# Patient Record
Sex: MALE | Race: WHITE
[De-identification: names, ages, dates, MRNs, and addresses within clinical notes are randomized per-mention and may not be internally consistent; named-entity substitution may affect disease eponyms.]

---

## 2018-04-19 ENCOUNTER — HOSPITAL ENCOUNTER (EMERGENCY)
Dept: HOSPITAL 8 - ED | Age: 38
LOS: 1 days | Discharge: HOME | End: 2018-04-20
Payer: COMMERCIAL

## 2018-04-19 VITALS — HEIGHT: 74 IN | WEIGHT: 215.61 LBS | BODY MASS INDEX: 27.67 KG/M2

## 2018-04-19 DIAGNOSIS — R11.2: Primary | ICD-10-CM

## 2018-04-19 DIAGNOSIS — Z79.899: ICD-10-CM

## 2018-04-19 DIAGNOSIS — R79.1: ICD-10-CM

## 2018-04-19 PROCEDURE — 83690 ASSAY OF LIPASE: CPT

## 2018-04-19 PROCEDURE — 96361 HYDRATE IV INFUSION ADD-ON: CPT

## 2018-04-19 PROCEDURE — 96375 TX/PRO/DX INJ NEW DRUG ADDON: CPT

## 2018-04-19 PROCEDURE — 80307 DRUG TEST PRSMV CHEM ANLYZR: CPT

## 2018-04-19 PROCEDURE — 93005 ELECTROCARDIOGRAM TRACING: CPT

## 2018-04-19 PROCEDURE — 85610 PROTHROMBIN TIME: CPT

## 2018-04-19 PROCEDURE — 80053 COMPREHEN METABOLIC PANEL: CPT

## 2018-04-19 PROCEDURE — 85025 COMPLETE CBC W/AUTO DIFF WBC: CPT

## 2018-04-19 PROCEDURE — 99285 EMERGENCY DEPT VISIT HI MDM: CPT

## 2018-04-19 PROCEDURE — 85730 THROMBOPLASTIN TIME PARTIAL: CPT

## 2018-04-19 PROCEDURE — 36415 COLL VENOUS BLD VENIPUNCTURE: CPT

## 2018-04-19 PROCEDURE — 96374 THER/PROPH/DIAG INJ IV PUSH: CPT

## 2018-04-20 VITALS — DIASTOLIC BLOOD PRESSURE: 100 MMHG | SYSTOLIC BLOOD PRESSURE: 140 MMHG

## 2018-04-20 LAB
ALBUMIN SERPL-MCNC: 4 G/DL (ref 3.4–5)
ALP SERPL-CCNC: 75 U/L (ref 45–117)
ALT SERPL-CCNC: 221 U/L (ref 12–78)
ANION GAP SERPL CALC-SCNC: 12 MMOL/L (ref 5–15)
APTT BLD: 27 SECONDS (ref 25–31)
BASOPHILS # BLD AUTO: 0.03 X10^3/UL (ref 0–0.1)
BASOPHILS NFR BLD AUTO: 1 % (ref 0–1)
BILIRUB SERPL-MCNC: 0.6 MG/DL (ref 0.2–1)
CALCIUM SERPL-MCNC: 8.5 MG/DL (ref 8.5–10.1)
CHLORIDE SERPL-SCNC: 104 MMOL/L (ref 98–107)
CREAT SERPL-MCNC: 0.93 MG/DL (ref 0.7–1.3)
EOSINOPHIL # BLD AUTO: 0.01 X10^3/UL (ref 0–0.4)
EOSINOPHIL NFR BLD AUTO: 0 % (ref 1–7)
ERYTHROCYTE [DISTWIDTH] IN BLOOD BY AUTOMATED COUNT: 13.2 % (ref 9.4–14.8)
INR PPP: 1.07 (ref 0.93–1.1)
LYMPHOCYTES # BLD AUTO: 2.1 X10^3/UL (ref 1–3.4)
LYMPHOCYTES NFR BLD AUTO: 34 % (ref 22–44)
MCH RBC QN AUTO: 34.2 PG (ref 27.5–34.5)
MCHC RBC AUTO-ENTMCNC: 34.8 G/DL (ref 33.2–36.2)
MCV RBC AUTO: 98.4 FL (ref 81–97)
MD: NO
MONOCYTES # BLD AUTO: 0.52 X10^3/UL (ref 0.2–0.8)
MONOCYTES NFR BLD AUTO: 8 % (ref 2–9)
NEUTROPHILS # BLD AUTO: 3.59 X10^3/UL (ref 1.8–6.8)
NEUTROPHILS NFR BLD AUTO: 58 % (ref 42–75)
PLATELET # BLD AUTO: 281 X10^3/UL (ref 130–400)
PMV BLD AUTO: 7.8 FL (ref 7.4–10.4)
PROT SERPL-MCNC: 8.4 G/DL (ref 6.4–8.2)
PROTHROMBIN TIME: 11 SECONDS (ref 9.6–11.5)
RBC # BLD AUTO: 5.31 X10^6/UL (ref 4.38–5.82)

## 2018-06-18 ENCOUNTER — NON-PROVIDER VISIT (OUTPATIENT)
Dept: URGENT CARE | Facility: CLINIC | Age: 38
End: 2018-06-18

## 2018-06-18 DIAGNOSIS — Z11.1 ENCOUNTER FOR PPD TEST: ICD-10-CM

## 2018-06-18 PROCEDURE — 86580 TB INTRADERMAL TEST: CPT | Performed by: PHYSICIAN ASSISTANT

## 2018-06-20 ENCOUNTER — NON-PROVIDER VISIT (OUTPATIENT)
Dept: URGENT CARE | Facility: CLINIC | Age: 38
End: 2018-06-20

## 2018-06-20 LAB — TB WHEAL 3D P 5 TU DIAM: NORMAL MM

## 2018-06-20 NOTE — NON-PROVIDER
Bobby Marrero is a 38 y.o. male here for a non-provider visit for PPD reading -- Step 1 of 1.      1.  Resulted in Epic under enter/edit results? Yes   2.  TB evaluation questionnaire scanned into chart and original given to patient?Yes      3. Was induration greater than 0 mm? No.    Routed to PCP? No

## 2019-01-24 ENCOUNTER — HOSPITAL ENCOUNTER (EMERGENCY)
Dept: HOSPITAL 8 - ED | Age: 39
Discharge: HOME | End: 2019-01-24
Payer: COMMERCIAL

## 2019-01-24 VITALS — SYSTOLIC BLOOD PRESSURE: 137 MMHG | DIASTOLIC BLOOD PRESSURE: 93 MMHG

## 2019-01-24 VITALS — WEIGHT: 216.05 LBS | BODY MASS INDEX: 27.73 KG/M2 | HEIGHT: 74 IN
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
## 2019-01-24 DIAGNOSIS — I10: ICD-10-CM

## 2019-01-24 DIAGNOSIS — R45.1: ICD-10-CM

## 2019-01-24 DIAGNOSIS — M10.9: ICD-10-CM

## 2019-01-24 DIAGNOSIS — F10.239: Primary | ICD-10-CM

## 2019-01-24 DIAGNOSIS — R45.4: ICD-10-CM

## 2019-01-24 LAB
ALBUMIN SERPL-MCNC: 3.7 G/DL (ref 3.4–5)
ANION GAP SERPL CALC-SCNC: 3 MMOL/L (ref 5–15)
BASOPHILS # BLD AUTO: 0.03 X10^3/UL (ref 0–0.1)
BASOPHILS NFR BLD AUTO: 1 % (ref 0–1)
CALCIUM SERPL-MCNC: 8.9 MG/DL (ref 8.5–10.1)
CHLORIDE SERPL-SCNC: 107 MMOL/L (ref 98–107)
CREAT SERPL-MCNC: 0.83 MG/DL (ref 0.7–1.3)
EOSINOPHIL # BLD AUTO: 0.04 X10^3/UL (ref 0–0.4)
EOSINOPHIL NFR BLD AUTO: 1 % (ref 1–7)
ERYTHROCYTE [DISTWIDTH] IN BLOOD BY AUTOMATED COUNT: 13.4 % (ref 9.4–14.8)
LYMPHOCYTES # BLD AUTO: 1.28 X10^3/UL (ref 1–3.4)
LYMPHOCYTES NFR BLD AUTO: 19 % (ref 22–44)
MCH RBC QN AUTO: 33.4 PG (ref 27.5–34.5)
MCHC RBC AUTO-ENTMCNC: 33.9 G/DL (ref 33.2–36.2)
MCV RBC AUTO: 98.6 FL (ref 81–97)
MD: NO
MONOCYTES # BLD AUTO: 0.38 X10^3/UL (ref 0.2–0.8)
MONOCYTES NFR BLD AUTO: 6 % (ref 2–9)
NEUTROPHILS # BLD AUTO: 4.93 X10^3/UL (ref 1.8–6.8)
NEUTROPHILS NFR BLD AUTO: 74 % (ref 42–75)
PLATELET # BLD AUTO: 245 X10^3/UL (ref 130–400)
PMV BLD AUTO: 7.4 FL (ref 7.4–10.4)
RBC # BLD AUTO: 4.38 X10^6/UL (ref 4.38–5.82)

## 2019-01-24 PROCEDURE — 93005 ELECTROCARDIOGRAM TRACING: CPT

## 2019-01-24 PROCEDURE — 82040 ASSAY OF SERUM ALBUMIN: CPT

## 2019-01-24 PROCEDURE — 85025 COMPLETE CBC W/AUTO DIFF WBC: CPT

## 2019-01-24 PROCEDURE — 80048 BASIC METABOLIC PNL TOTAL CA: CPT

## 2019-01-24 PROCEDURE — 99284 EMERGENCY DEPT VISIT MOD MDM: CPT

## 2019-01-24 PROCEDURE — 36415 COLL VENOUS BLD VENIPUNCTURE: CPT

## 2019-03-24 ENCOUNTER — HOSPITAL ENCOUNTER (EMERGENCY)
Dept: HOSPITAL 8 - ED | Age: 39
Discharge: HOME | End: 2019-03-24
Payer: COMMERCIAL

## 2019-03-24 VITALS — BODY MASS INDEX: 27.44 KG/M2 | WEIGHT: 213.85 LBS | HEIGHT: 74 IN

## 2019-03-24 VITALS — SYSTOLIC BLOOD PRESSURE: 134 MMHG | DIASTOLIC BLOOD PRESSURE: 86 MMHG

## 2019-03-24 DIAGNOSIS — M10.9: ICD-10-CM

## 2019-03-24 DIAGNOSIS — R10.11: Primary | ICD-10-CM

## 2019-03-24 DIAGNOSIS — I10: ICD-10-CM

## 2019-03-24 LAB
ALBUMIN SERPL-MCNC: 4.1 G/DL (ref 3.4–5)
ALP SERPL-CCNC: 63 U/L (ref 45–117)
ALT SERPL-CCNC: 50 U/L (ref 12–78)
ANION GAP SERPL CALC-SCNC: 7 MMOL/L (ref 5–15)
BASOPHILS # BLD AUTO: 0.02 X10^3/UL (ref 0–0.1)
BASOPHILS NFR BLD AUTO: 0 % (ref 0–1)
BILIRUB SERPL-MCNC: 0.6 MG/DL (ref 0.2–1)
CALCIUM SERPL-MCNC: 9.2 MG/DL (ref 8.5–10.1)
CHLORIDE SERPL-SCNC: 102 MMOL/L (ref 98–107)
CREAT SERPL-MCNC: 0.88 MG/DL (ref 0.7–1.3)
CULTURE INDICATED?: NO
EOSINOPHIL # BLD AUTO: 0.02 X10^3/UL (ref 0–0.4)
EOSINOPHIL NFR BLD AUTO: 0 % (ref 1–7)
ERYTHROCYTE [DISTWIDTH] IN BLOOD BY AUTOMATED COUNT: 13.3 % (ref 9.4–14.8)
LYMPHOCYTES # BLD AUTO: 1.13 X10^3/UL (ref 1–3.4)
LYMPHOCYTES NFR BLD AUTO: 17 % (ref 22–44)
MCH RBC QN AUTO: 34.2 PG (ref 27.5–34.5)
MCHC RBC AUTO-ENTMCNC: 34.4 G/DL (ref 33.2–36.2)
MCV RBC AUTO: 99.4 FL (ref 81–97)
MD: NO
MICROSCOPIC: (no result)
MONOCYTES # BLD AUTO: 0.53 X10^3/UL (ref 0.2–0.8)
MONOCYTES NFR BLD AUTO: 8 % (ref 2–9)
NEUTROPHILS # BLD AUTO: 4.81 X10^3/UL (ref 1.8–6.8)
NEUTROPHILS NFR BLD AUTO: 74 % (ref 42–75)
PLATELET # BLD AUTO: 259 X10^3/UL (ref 130–400)
PMV BLD AUTO: 7.7 FL (ref 7.4–10.4)
PROT SERPL-MCNC: 8 G/DL (ref 6.4–8.2)
RBC # BLD AUTO: 4.47 X10^6/UL (ref 4.38–5.82)

## 2019-03-24 PROCEDURE — 96374 THER/PROPH/DIAG INJ IV PUSH: CPT

## 2019-03-24 PROCEDURE — 83690 ASSAY OF LIPASE: CPT

## 2019-03-24 PROCEDURE — 71045 X-RAY EXAM CHEST 1 VIEW: CPT

## 2019-03-24 PROCEDURE — 76700 US EXAM ABDOM COMPLETE: CPT

## 2019-03-24 PROCEDURE — 80053 COMPREHEN METABOLIC PANEL: CPT

## 2019-03-24 PROCEDURE — 81003 URINALYSIS AUTO W/O SCOPE: CPT

## 2019-03-24 PROCEDURE — 36415 COLL VENOUS BLD VENIPUNCTURE: CPT

## 2019-03-24 PROCEDURE — 85025 COMPLETE CBC W/AUTO DIFF WBC: CPT

## 2019-03-24 PROCEDURE — 99284 EMERGENCY DEPT VISIT MOD MDM: CPT

## 2019-03-24 PROCEDURE — 74177 CT ABD & PELVIS W/CONTRAST: CPT

## 2019-03-24 PROCEDURE — 96375 TX/PRO/DX INJ NEW DRUG ADDON: CPT
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
## 2019-03-24 NOTE — NUR
MORPHINE RETURNED AS PATIENT SCARED "IT WILL BE TOO MUCH." ASKED PROVIDER FOR 
TORADOL ORDER INSTEAD

## 2019-03-24 NOTE — NUR
PATIENT REPORTS PAIN IMPROVED TO 3/10, VSS. UPDATED ESTIMATED POC (RECHECK 
PLACED). RESTING COMFORTABLE IN BED WITH CALL BELL IN HAND, SIDE RAILS UP. 
ADVISED HE CALL A RIDE AS HE WAS JUST MEDICATED

## 2019-03-24 NOTE — NUR
AFTER DISCUSSION W/ MD. PATIENT WANT MORPHINE. TO ADMINISTER SHORTLY. VSS. NOT 
IN ANY OBVIOUS DISCOMFORT. ON MONITOR, CALL BELL IN HAND, SIDE RAILS UP. TO CT 
SCAN. UA SENT. ESTIMATED ON ESTIMATED POC

## 2021-04-26 ENCOUNTER — HOSPITAL ENCOUNTER (EMERGENCY)
Facility: MEDICAL CENTER | Age: 41
End: 2021-04-26
Attending: EMERGENCY MEDICINE
Payer: COMMERCIAL

## 2021-04-26 VITALS
DIASTOLIC BLOOD PRESSURE: 99 MMHG | WEIGHT: 240 LBS | HEART RATE: 117 BPM | BODY MASS INDEX: 30.81 KG/M2 | RESPIRATION RATE: 18 BRPM | TEMPERATURE: 97.7 F | OXYGEN SATURATION: 93 % | SYSTOLIC BLOOD PRESSURE: 131 MMHG
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
## 2021-04-26 PROCEDURE — 99284 EMERGENCY DEPT VISIT MOD MDM: CPT
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
## 2021-04-27 NOTE — ED NOTES
Discharge instructions reviewed and signed with patient. All questions answered at this time. Patient ambulated out of ED with a steady gait. Pt left with PD

## 2021-06-29 PROBLEM — M51.36 LUMBAR DEGENERATIVE DISC DISEASE: Status: ACTIVE | Noted: 2021-06-29

## 2021-06-29 PROBLEM — M79.10 MYALGIA: Status: ACTIVE | Noted: 2021-06-29

## 2021-06-29 PROBLEM — M51.369 LUMBAR DEGENERATIVE DISC DISEASE: Status: ACTIVE | Noted: 2021-06-29

## 2021-06-29 PROBLEM — M54.16 LUMBAR RADICULITIS: Status: ACTIVE | Noted: 2021-06-29

## 2021-10-15 PROBLEM — M51.16 DISPLACEMENT OF NUCLEUS PULPOSUS OF LUMBAR INTERVERTEBRAL DISC WITH SCIATICA: Status: ACTIVE | Noted: 2021-10-15

## 2021-11-24 ENCOUNTER — HOSPITAL ENCOUNTER (OUTPATIENT)
Facility: MEDICAL CENTER | Age: 41
End: 2021-11-24
Attending: ANESTHESIOLOGY
Payer: COMMERCIAL

## 2021-11-24 LAB
COVID ORDER STATUS COVID19: NORMAL
SARS-COV-2 RNA RESP QL NAA+PROBE: NOTDETECTED
SPECIMEN SOURCE: NORMAL

## 2021-11-24 PROCEDURE — U0005 INFEC AGEN DETEC AMPLI PROBE: HCPCS

## 2021-11-24 PROCEDURE — U0003 INFECTIOUS AGENT DETECTION BY NUCLEIC ACID (DNA OR RNA); SEVERE ACUTE RESPIRATORY SYNDROME CORONAVIRUS 2 (SARS-COV-2) (CORONAVIRUS DISEASE [COVID-19]), AMPLIFIED PROBE TECHNIQUE, MAKING USE OF HIGH THROUGHPUT TECHNOLOGIES AS DESCRIBED BY CMS-2020-01-R: HCPCS

## 2021-12-01 ENCOUNTER — TELEPHONE (OUTPATIENT)
Dept: NEUROSURGERY | Facility: MEDICAL CENTER | Age: 41
End: 2021-12-01

## 2021-12-01 NOTE — TELEPHONE ENCOUNTER
Postop check-in phone call.  Overall doing okay.  He still has some surgical site pain.  His Norco prescription does not help him significantly.  He also gets some itching with the Norco.  I did advise him to take 1-2 tablets of Norco every 4-6 hours.  He should also add some Benadryl to this.  If he continues with side effects he will let us know.  Routine postop instructions provided.  He will call with any questions or concerns.

## 2021-12-03 DIAGNOSIS — G89.18 POSTOPERATIVE PAIN: ICD-10-CM

## 2021-12-03 RX ORDER — OXYCODONE HYDROCHLORIDE AND ACETAMINOPHEN 5; 325 MG/1; MG/1
1 TABLET ORAL EVERY 6 HOURS PRN
Qty: 28 TABLET | Refills: 0 | Status: SHIPPED | OUTPATIENT
Start: 2021-12-03 | End: 2021-12-10

## 2021-12-03 NOTE — TELEPHONE ENCOUNTER
Patient's wife dropped off remaining hydrocodone prescription.  There is 7.5 pills remaining of the initial 28 fills.  This was initially dispensed on November 30, 2021.  Our office will dispose of this unused narcotic.  This plan has been discussed with both medical assistance in our office.  I have agreed to prescribe another week of Percocet and replacement of the hydrocodone.

## 2022-04-21 ENCOUNTER — HOSPITAL ENCOUNTER (OUTPATIENT)
Dept: LAB | Facility: MEDICAL CENTER | Age: 42
End: 2022-04-21
Attending: PODIATRIST
Payer: COMMERCIAL

## 2022-04-21 LAB
BASOPHILS # BLD AUTO: 0.7 % (ref 0–1.8)
BASOPHILS # BLD: 0.04 K/UL (ref 0–0.12)
CRP SERPL HS-MCNC: <0.3 MG/DL (ref 0–0.75)
EOSINOPHIL # BLD AUTO: 0.07 K/UL (ref 0–0.51)
EOSINOPHIL NFR BLD: 1.3 % (ref 0–6.9)
ERYTHROCYTE [DISTWIDTH] IN BLOOD BY AUTOMATED COUNT: 41.9 FL (ref 35.9–50)
ERYTHROCYTE [SEDIMENTATION RATE] IN BLOOD BY WESTERGREN METHOD: 5 MM/HOUR (ref 0–20)
HCT VFR BLD AUTO: 44.5 % (ref 42–52)
HGB BLD-MCNC: 14.8 G/DL (ref 14–18)
IMM GRANULOCYTES # BLD AUTO: 0.02 K/UL (ref 0–0.11)
IMM GRANULOCYTES NFR BLD AUTO: 0.4 % (ref 0–0.9)
LYMPHOCYTES # BLD AUTO: 2.18 K/UL (ref 1–4.8)
LYMPHOCYTES NFR BLD: 39.7 % (ref 22–41)
MCH RBC QN AUTO: 30.4 PG (ref 27–33)
MCHC RBC AUTO-ENTMCNC: 33.3 G/DL (ref 33.7–35.3)
MCV RBC AUTO: 91.4 FL (ref 81.4–97.8)
MONOCYTES # BLD AUTO: 0.43 K/UL (ref 0–0.85)
MONOCYTES NFR BLD AUTO: 7.8 % (ref 0–13.4)
NEUTROPHILS # BLD AUTO: 2.75 K/UL (ref 1.82–7.42)
NEUTROPHILS NFR BLD: 50.1 % (ref 44–72)
NRBC # BLD AUTO: 0 K/UL
NRBC BLD-RTO: 0 /100 WBC
PLATELET # BLD AUTO: 265 K/UL (ref 164–446)
PMV BLD AUTO: 9.4 FL (ref 9–12.9)
RBC # BLD AUTO: 4.87 M/UL (ref 4.7–6.1)
RHEUMATOID FACT SER IA-ACNC: <10 IU/ML (ref 0–14)
URATE SERPL-MCNC: 8.5 MG/DL (ref 2.5–8.3)
WBC # BLD AUTO: 5.5 K/UL (ref 4.8–10.8)

## 2022-04-21 PROCEDURE — 86140 C-REACTIVE PROTEIN: CPT

## 2022-04-21 PROCEDURE — 84550 ASSAY OF BLOOD/URIC ACID: CPT

## 2022-04-21 PROCEDURE — 85652 RBC SED RATE AUTOMATED: CPT

## 2022-04-21 PROCEDURE — 86038 ANTINUCLEAR ANTIBODIES: CPT

## 2022-04-21 PROCEDURE — 36415 COLL VENOUS BLD VENIPUNCTURE: CPT

## 2022-04-21 PROCEDURE — 85025 COMPLETE CBC W/AUTO DIFF WBC: CPT

## 2022-04-21 PROCEDURE — 86431 RHEUMATOID FACTOR QUANT: CPT

## 2022-04-24 LAB — NUCLEAR IGG SER QL IA: NORMAL

## 2024-01-12 PROBLEM — M16.9 OSTEOARTHRITIS OF HIP: Status: ACTIVE | Noted: 2024-01-12

## 2025-08-11 ENCOUNTER — HOSPITAL ENCOUNTER (EMERGENCY)
Facility: MEDICAL CENTER | Age: 45
End: 2025-08-12
Attending: EMERGENCY MEDICINE
Payer: COMMERCIAL

## 2025-08-11 DIAGNOSIS — F10.920 ALCOHOLIC INTOXICATION WITHOUT COMPLICATION (HCC): Primary | ICD-10-CM

## 2025-08-11 DIAGNOSIS — R46.89 AGGRESSIVE BEHAVIOR: ICD-10-CM

## 2025-08-11 DIAGNOSIS — F10.10 ALCOHOL ABUSE: ICD-10-CM

## 2025-08-11 LAB
ALBUMIN SERPL BCP-MCNC: 4.2 G/DL (ref 3.2–4.9)
ALBUMIN/GLOB SERPL: 1.4 G/DL
ALP SERPL-CCNC: 96 U/L (ref 30–99)
ALT SERPL-CCNC: 65 U/L (ref 2–50)
ANION GAP SERPL CALC-SCNC: 24 MMOL/L (ref 7–16)
AST SERPL-CCNC: 121 U/L (ref 12–45)
BASOPHILS # BLD AUTO: 0.6 % (ref 0–1.8)
BASOPHILS # BLD: 0.03 K/UL (ref 0–0.12)
BILIRUB SERPL-MCNC: 0.8 MG/DL (ref 0.1–1.5)
BUN SERPL-MCNC: 8 MG/DL (ref 8–22)
CALCIUM ALBUM COR SERPL-MCNC: 8.5 MG/DL (ref 8.5–10.5)
CALCIUM SERPL-MCNC: 8.7 MG/DL (ref 8.5–10.5)
CHLORIDE SERPL-SCNC: 96 MMOL/L (ref 96–112)
CO2 SERPL-SCNC: 20 MMOL/L (ref 20–33)
CREAT SERPL-MCNC: 0.76 MG/DL (ref 0.5–1.4)
EOSINOPHIL # BLD AUTO: 0.02 K/UL (ref 0–0.51)
EOSINOPHIL NFR BLD: 0.4 % (ref 0–6.9)
ERYTHROCYTE [DISTWIDTH] IN BLOOD BY AUTOMATED COUNT: 51.1 FL (ref 35.9–50)
ETHANOL BLD-MCNC: 345 MG/DL
GFR SERPLBLD CREATININE-BSD FMLA CKD-EPI: 113 ML/MIN/1.73 M 2
GLOBULIN SER CALC-MCNC: 2.9 G/DL (ref 1.9–3.5)
GLUCOSE SERPL-MCNC: 85 MG/DL (ref 65–99)
HCT VFR BLD AUTO: 40.2 % (ref 42–52)
HGB BLD-MCNC: 14.4 G/DL (ref 14–18)
IMM GRANULOCYTES # BLD AUTO: 0.02 K/UL (ref 0–0.11)
IMM GRANULOCYTES NFR BLD AUTO: 0.4 % (ref 0–0.9)
LYMPHOCYTES # BLD AUTO: 1.07 K/UL (ref 1–4.8)
LYMPHOCYTES NFR BLD: 22.5 % (ref 22–41)
MCH RBC QN AUTO: 33.6 PG (ref 27–33)
MCHC RBC AUTO-ENTMCNC: 35.8 G/DL (ref 32.3–36.5)
MCV RBC AUTO: 93.7 FL (ref 81.4–97.8)
MONOCYTES # BLD AUTO: 0.54 K/UL (ref 0–0.85)
MONOCYTES NFR BLD AUTO: 11.4 % (ref 0–13.4)
NEUTROPHILS # BLD AUTO: 3.07 K/UL (ref 1.82–7.42)
NEUTROPHILS NFR BLD: 64.7 % (ref 44–72)
NRBC # BLD AUTO: 0 K/UL
NRBC BLD-RTO: 0 /100 WBC (ref 0–0.2)
PLATELET # BLD AUTO: 100 K/UL (ref 164–446)
PMV BLD AUTO: 10.2 FL (ref 9–12.9)
POTASSIUM SERPL-SCNC: 3.2 MMOL/L (ref 3.6–5.5)
PROT SERPL-MCNC: 7.1 G/DL (ref 6–8.2)
RBC # BLD AUTO: 4.29 M/UL (ref 4.7–6.1)
SODIUM SERPL-SCNC: 140 MMOL/L (ref 135–145)
WBC # BLD AUTO: 4.8 K/UL (ref 4.8–10.8)

## 2025-08-11 PROCEDURE — 36415 COLL VENOUS BLD VENIPUNCTURE: CPT

## 2025-08-11 PROCEDURE — 99285 EMERGENCY DEPT VISIT HI MDM: CPT

## 2025-08-11 PROCEDURE — 80053 COMPREHEN METABOLIC PANEL: CPT

## 2025-08-11 PROCEDURE — 96375 TX/PRO/DX INJ NEW DRUG ADDON: CPT

## 2025-08-11 PROCEDURE — 700111 HCHG RX REV CODE 636 W/ 250 OVERRIDE (IP): Mod: JZ | Performed by: EMERGENCY MEDICINE

## 2025-08-11 PROCEDURE — 96374 THER/PROPH/DIAG INJ IV PUSH: CPT

## 2025-08-11 PROCEDURE — 96376 TX/PRO/DX INJ SAME DRUG ADON: CPT

## 2025-08-11 PROCEDURE — 700105 HCHG RX REV CODE 258: Performed by: EMERGENCY MEDICINE

## 2025-08-11 PROCEDURE — 85025 COMPLETE CBC W/AUTO DIFF WBC: CPT

## 2025-08-11 PROCEDURE — 82077 ASSAY SPEC XCP UR&BREATH IA: CPT

## 2025-08-11 PROCEDURE — 700102 HCHG RX REV CODE 250 W/ 637 OVERRIDE(OP): Performed by: EMERGENCY MEDICINE

## 2025-08-11 PROCEDURE — A9270 NON-COVERED ITEM OR SERVICE: HCPCS | Performed by: EMERGENCY MEDICINE

## 2025-08-11 RX ORDER — SODIUM CHLORIDE 9 MG/ML
1000 INJECTION, SOLUTION INTRAVENOUS ONCE
Status: COMPLETED | OUTPATIENT
Start: 2025-08-11 | End: 2025-08-11

## 2025-08-11 RX ORDER — LORAZEPAM 0.5 MG/1
0.5 TABLET ORAL EVERY 4 HOURS PRN
Status: DISCONTINUED | OUTPATIENT
Start: 2025-08-11 | End: 2025-08-12 | Stop reason: HOSPADM

## 2025-08-11 RX ORDER — LORAZEPAM 1 MG/1
1 TABLET ORAL EVERY 4 HOURS PRN
Status: DISCONTINUED | OUTPATIENT
Start: 2025-08-11 | End: 2025-08-12 | Stop reason: HOSPADM

## 2025-08-11 RX ORDER — LORAZEPAM 1 MG/1
4 TABLET ORAL
Status: DISCONTINUED | OUTPATIENT
Start: 2025-08-11 | End: 2025-08-12 | Stop reason: HOSPADM

## 2025-08-11 RX ORDER — DIAZEPAM 10 MG/2ML
10 INJECTION, SOLUTION INTRAMUSCULAR; INTRAVENOUS ONCE
Status: COMPLETED | OUTPATIENT
Start: 2025-08-11 | End: 2025-08-11

## 2025-08-11 RX ORDER — LORAZEPAM 1 MG/1
2 TABLET ORAL
Status: DISCONTINUED | OUTPATIENT
Start: 2025-08-11 | End: 2025-08-12 | Stop reason: HOSPADM

## 2025-08-11 RX ORDER — ONDANSETRON 2 MG/ML
4 INJECTION INTRAMUSCULAR; INTRAVENOUS ONCE
Status: COMPLETED | OUTPATIENT
Start: 2025-08-11 | End: 2025-08-11

## 2025-08-11 RX ORDER — DIAZEPAM 10 MG/2ML
5 INJECTION, SOLUTION INTRAMUSCULAR; INTRAVENOUS ONCE
Status: COMPLETED | OUTPATIENT
Start: 2025-08-11 | End: 2025-08-11

## 2025-08-11 RX ORDER — DIAZEPAM 10 MG/2ML
10 INJECTION, SOLUTION INTRAMUSCULAR; INTRAVENOUS
Status: DISCONTINUED | OUTPATIENT
Start: 2025-08-11 | End: 2025-08-12 | Stop reason: HOSPADM

## 2025-08-11 RX ORDER — LORAZEPAM 1 MG/1
3 TABLET ORAL
Status: DISCONTINUED | OUTPATIENT
Start: 2025-08-11 | End: 2025-08-12 | Stop reason: HOSPADM

## 2025-08-11 RX ADMIN — DIAZEPAM 5 MG: 10 INJECTION, SOLUTION INTRAMUSCULAR; INTRAVENOUS at 19:39

## 2025-08-11 RX ADMIN — LORAZEPAM 3 MG: 1 TABLET ORAL at 22:33

## 2025-08-11 RX ADMIN — DIAZEPAM 5 MG: 5 INJECTION, SOLUTION INTRAMUSCULAR; INTRAVENOUS at 21:51

## 2025-08-11 RX ADMIN — DIAZEPAM 10 MG: 5 INJECTION, SOLUTION INTRAMUSCULAR; INTRAVENOUS at 18:26

## 2025-08-11 RX ADMIN — SODIUM CHLORIDE 1000 ML: 9 INJECTION, SOLUTION INTRAVENOUS at 18:26

## 2025-08-11 RX ADMIN — ONDANSETRON 4 MG: 2 INJECTION INTRAMUSCULAR; INTRAVENOUS at 18:38

## 2025-08-12 VITALS
BODY MASS INDEX: 23.94 KG/M2 | WEIGHT: 186.51 LBS | HEART RATE: 106 BPM | DIASTOLIC BLOOD PRESSURE: 92 MMHG | TEMPERATURE: 99.1 F | SYSTOLIC BLOOD PRESSURE: 165 MMHG | RESPIRATION RATE: 19 BRPM | OXYGEN SATURATION: 95 % | HEIGHT: 74 IN

## 2025-08-12 PROCEDURE — 700102 HCHG RX REV CODE 250 W/ 637 OVERRIDE(OP): Performed by: EMERGENCY MEDICINE

## 2025-08-12 PROCEDURE — 96375 TX/PRO/DX INJ NEW DRUG ADDON: CPT

## 2025-08-12 PROCEDURE — 96376 TX/PRO/DX INJ SAME DRUG ADON: CPT

## 2025-08-12 PROCEDURE — A9270 NON-COVERED ITEM OR SERVICE: HCPCS | Performed by: EMERGENCY MEDICINE

## 2025-08-12 PROCEDURE — 36415 COLL VENOUS BLD VENIPUNCTURE: CPT

## 2025-08-12 PROCEDURE — 96374 THER/PROPH/DIAG INJ IV PUSH: CPT

## 2025-08-12 PROCEDURE — 99285 EMERGENCY DEPT VISIT HI MDM: CPT

## 2025-08-12 PROCEDURE — 700111 HCHG RX REV CODE 636 W/ 250 OVERRIDE (IP): Performed by: EMERGENCY MEDICINE

## 2025-08-12 RX ORDER — DIAZEPAM 5 MG/1
5 TABLET ORAL ONCE
Status: COMPLETED | OUTPATIENT
Start: 2025-08-12 | End: 2025-08-12

## 2025-08-12 RX ORDER — ONDANSETRON 4 MG/1
4 TABLET, ORALLY DISINTEGRATING ORAL ONCE
Status: COMPLETED | OUTPATIENT
Start: 2025-08-12 | End: 2025-08-12

## 2025-08-12 RX ADMIN — ONDANSETRON 4 MG: 4 TABLET, ORALLY DISINTEGRATING ORAL at 09:24

## 2025-08-12 RX ADMIN — DIAZEPAM 5 MG: 5 TABLET ORAL at 09:24

## 2025-08-12 RX ADMIN — LORAZEPAM 2 MG: 1 TABLET ORAL at 07:47

## 2025-08-12 RX ADMIN — LORAZEPAM 2 MG: 1 TABLET ORAL at 05:38

## 2025-08-12 RX ADMIN — LORAZEPAM 1 MG: 1 TABLET ORAL at 09:51

## 2025-08-12 RX ADMIN — LORAZEPAM 2 MG: 1 TABLET ORAL at 01:07

## 2025-08-12 ASSESSMENT — LIFESTYLE VARIABLES
TOTAL SCORE: 11
NAUSEA AND VOMITING: *
ORIENTATION AND CLOUDING OF SENSORIUM: ORIENTED AND CAN DO SERIAL ADDITIONS
AGITATION: *
AGITATION: SOMEWHAT MORE THAN NORMAL ACTIVITY
TREMOR: *
ANXIETY: MILDLY ANXIOUS
NAUSEA AND VOMITING: MILD NAUSEA WITH NO VOMITING
ANXIETY: *
AUDITORY DISTURBANCES: NOT PRESENT
PAROXYSMAL SWEATS: BARELY PERCEPTIBLE SWEATING. PALMS MOIST
HEADACHE, FULLNESS IN HEAD: VERY MILD
TREMOR: *
TOTAL SCORE: 9
VISUAL DISTURBANCES: NOT PRESENT
PAROXYSMAL SWEATS: BARELY PERCEPTIBLE SWEATING. PALMS MOIST
VISUAL DISTURBANCES: NOT PRESENT
HEADACHE, FULLNESS IN HEAD: MILD
AUDITORY DISTURBANCES: NOT PRESENT
ORIENTATION AND CLOUDING OF SENSORIUM: ORIENTED AND CAN DO SERIAL ADDITIONS

## 2025-08-12 ASSESSMENT — FIBROSIS 4 INDEX: FIB4 SCORE: 6.75
